# Patient Record
Sex: MALE | Race: WHITE | URBAN - METROPOLITAN AREA
[De-identification: names, ages, dates, MRNs, and addresses within clinical notes are randomized per-mention and may not be internally consistent; named-entity substitution may affect disease eponyms.]

---

## 2017-11-03 ENCOUNTER — EMERGENCY (EMERGENCY)
Facility: HOSPITAL | Age: 27
LOS: 0 days | Discharge: HOME | End: 2017-11-03
Attending: EMERGENCY MEDICINE | Admitting: EMERGENCY MEDICINE
Payer: COMMERCIAL

## 2017-11-03 VITALS — WEIGHT: 175.05 LBS | HEIGHT: 69 IN

## 2017-11-03 VITALS
DIASTOLIC BLOOD PRESSURE: 82 MMHG | SYSTOLIC BLOOD PRESSURE: 135 MMHG | OXYGEN SATURATION: 98 % | TEMPERATURE: 99 F | RESPIRATION RATE: 16 BRPM | HEART RATE: 87 BPM

## 2017-11-03 LAB
APPEARANCE UR: CLEAR — SIGNIFICANT CHANGE UP
BACTERIA # UR AUTO: (no result)
BILIRUB UR-MCNC: NEGATIVE — SIGNIFICANT CHANGE UP
COLOR SPEC: YELLOW — SIGNIFICANT CHANGE UP
COMMENT - URINE: SIGNIFICANT CHANGE UP
DIFF PNL FLD: (no result)
EPI CELLS # UR: SIGNIFICANT CHANGE UP
GLUCOSE UR QL: NEGATIVE MG/DL — SIGNIFICANT CHANGE UP
KETONES UR-MCNC: NEGATIVE — SIGNIFICANT CHANGE UP
LEUKOCYTE ESTERASE UR-ACNC: (no result)
NITRITE UR-MCNC: NEGATIVE — SIGNIFICANT CHANGE UP
PH UR: 7 — SIGNIFICANT CHANGE UP (ref 5–8)
PROT UR-MCNC: 15 MG/DL
RBC CASTS # UR COMP ASSIST: (no result) /HPF (ref 0–4)
SP GR SPEC: 1.01 — SIGNIFICANT CHANGE UP (ref 1.01–1.02)
UROBILINOGEN FLD QL: 1 MG/DL
WBC UR QL: SIGNIFICANT CHANGE UP

## 2017-11-03 PROCEDURE — 99284 EMERGENCY DEPT VISIT MOD MDM: CPT

## 2017-11-03 RX ORDER — PERMETHRIN CREAM 5% W/W 50 MG/G
1 CREAM TOPICAL
Qty: 1 | Refills: 0
Start: 2017-11-03

## 2017-11-03 NOTE — ED STATDOCS - SKIN, MLM
+multiple punctate papular lesions to both arms with excoriations, two lesions with linear distribution, concern for scabies

## 2017-11-03 NOTE — ED STATDOCS - PROGRESS NOTE DETAILS
Patient seen and evaluated, rash to be treated as scabies. ua unremarkable, he is urinating without difficulty and had a post void residual of 30cc on bladder scan. rec uro f/u if he still has discomfort -Cali Levine PA-C

## 2017-11-03 NOTE — ED STATDOCS - NS_ ATTENDINGSCRIBEDETAILS _ED_A_ED_FT
Steve Chavez DO (Attending): The history, relevant review of systems, past medical and surgical history, medical decision making, and physical examination was documented by the scribe in my presence and I attest to the accuracy of the documentation.

## 2017-11-03 NOTE — ED STATDOCS - MEDICAL DECISION MAKING DETAILS
27M no pmhx presents to ED C/O testicular pain yesterday and increased urgency to urinate and suprapubic pain. Plan UA and bladder scan.

## 2017-11-03 NOTE — ED ADULT TRIAGE NOTE - CHIEF COMPLAINT QUOTE
Pt. to the ED C/O "Bladder Pain". Pt. states he is able to void and denies burning, but feels urinary retention after voiding- Last voided this morning- Pt. denies hematuria and or discharge- Pt. also denies medical hx and radiation of pain-

## 2017-11-03 NOTE — ED STATDOCS - ATTENDING CONTRIBUTION TO CARE
I, Steve Chavez DO,  performed the initial face to face bedside interview with this patient regarding history of present illness, review of symptoms and relevant past medical, social and family history.  I completed an independent physical examination.  I was the initial provider who evaluated this patient. I have signed out the follow up of any pending tests (i.e. labs, radiological studies) to the ACP.  I have communicated the patient’s plan of care and disposition with the ACP.

## 2017-11-03 NOTE — ED STATDOCS - OBJECTIVE STATEMENT
27M no pmhx presents to ED C/O testicular pain yesterday (now resolved) and increased urgency to urinate and suprapubic pain. States having the urge to urinate but not being able to. Took 1 ibuprofen with mild relief. No dysuria, no discharge. Denies similar episodes in the past. Sexually active. Also notes a itchy rash on both sides of arm, starting 4 days ago. Denies hematuria. +smoker. No recent travel. 27M no pmhx presents to ED C/O increased urgency to urinate and suprapubic pain. States having the urge to urinate but not being able to. No dysuria, no discharge. Denies similar episodes in the past. Sexually active. Also notes a itchy rash on both sides of arm, starting 4 days ago. Denies hematuria. +smoker. No recent travel.  No F/C/CP/SOB.  No N/V/D.

## 2017-11-04 LAB
C TRACH RRNA SPEC QL NAA+PROBE: SIGNIFICANT CHANGE UP
N GONORRHOEA RRNA SPEC QL NAA+PROBE: SIGNIFICANT CHANGE UP
SPECIMEN SOURCE: SIGNIFICANT CHANGE UP
SPECIMEN SOURCE: SIGNIFICANT CHANGE UP

## 2017-11-09 DIAGNOSIS — R21 RASH AND OTHER NONSPECIFIC SKIN ERUPTION: ICD-10-CM

## 2017-11-09 DIAGNOSIS — R39.15 URGENCY OF URINATION: ICD-10-CM

## 2017-11-09 DIAGNOSIS — R35.0 FREQUENCY OF MICTURITION: ICD-10-CM

## 2017-11-09 DIAGNOSIS — B86 SCABIES: ICD-10-CM

## 2020-10-23 PROBLEM — Z00.00 ENCOUNTER FOR PREVENTIVE HEALTH EXAMINATION: Status: ACTIVE | Noted: 2020-10-23

## 2020-10-26 ENCOUNTER — APPOINTMENT (OUTPATIENT)
Dept: DERMATOLOGY | Facility: CLINIC | Age: 30
End: 2020-10-26
Payer: MEDICAID

## 2020-10-26 VITALS — BODY MASS INDEX: 28.14 KG/M2 | WEIGHT: 190 LBS | HEIGHT: 69 IN

## 2020-10-26 DIAGNOSIS — R21 RASH AND OTHER NONSPECIFIC SKIN ERUPTION: ICD-10-CM

## 2020-10-26 DIAGNOSIS — D48.9 NEOPLASM OF UNCERTAIN BEHAVIOR, UNSPECIFIED: ICD-10-CM

## 2020-10-26 DIAGNOSIS — D23.9 OTHER BENIGN NEOPLASM OF SKIN, UNSPECIFIED: ICD-10-CM

## 2020-10-26 PROCEDURE — 99072 ADDL SUPL MATRL&STAF TM PHE: CPT

## 2020-10-26 PROCEDURE — 99203 OFFICE O/P NEW LOW 30 MIN: CPT | Mod: 25

## 2020-10-26 PROCEDURE — 11102 TANGNTL BX SKIN SINGLE LES: CPT

## 2020-10-26 RX ORDER — MOMETASONE FUROATE 1 MG/G
0.1 OINTMENT TOPICAL
Qty: 1 | Refills: 1 | Status: ACTIVE | COMMUNITY
Start: 2020-10-26 | End: 1900-01-01

## 2020-10-26 RX ORDER — DOXYCYCLINE HYCLATE 100 MG/1
100 TABLET ORAL TWICE DAILY
Qty: 20 | Refills: 0 | Status: ACTIVE | COMMUNITY
Start: 2020-10-26 | End: 1900-01-01

## 2020-11-02 LAB — CORE LAB BIOPSY: NORMAL

## 2023-08-12 ENCOUNTER — EMERGENCY (EMERGENCY)
Facility: HOSPITAL | Age: 33
LOS: 0 days | Discharge: HOME | End: 2023-08-12
Attending: EMERGENCY MEDICINE
Payer: MEDICAID

## 2023-08-12 VITALS — WEIGHT: 195.11 LBS | HEIGHT: 69 IN

## 2023-08-12 VITALS
DIASTOLIC BLOOD PRESSURE: 90 MMHG | SYSTOLIC BLOOD PRESSURE: 150 MMHG | RESPIRATION RATE: 18 BRPM | OXYGEN SATURATION: 99 % | HEART RATE: 99 BPM | TEMPERATURE: 98 F

## 2023-08-12 DIAGNOSIS — M54.50 LOW BACK PAIN, UNSPECIFIED: ICD-10-CM

## 2023-08-12 DIAGNOSIS — G89.29 OTHER CHRONIC PAIN: ICD-10-CM

## 2023-08-12 PROCEDURE — 99283 EMERGENCY DEPT VISIT LOW MDM: CPT

## 2023-08-12 PROCEDURE — 99284 EMERGENCY DEPT VISIT MOD MDM: CPT

## 2023-08-12 RX ORDER — IBUPROFEN 200 MG
600 TABLET ORAL ONCE
Refills: 0 | Status: COMPLETED | OUTPATIENT
Start: 2023-08-12 | End: 2023-08-12

## 2023-08-12 RX ORDER — METHOCARBAMOL 500 MG/1
2 TABLET, FILM COATED ORAL
Qty: 12 | Refills: 0
Start: 2023-08-12 | End: 2023-08-13

## 2023-08-12 RX ORDER — LIDOCAINE 4 G/100G
1 CREAM TOPICAL ONCE
Refills: 0 | Status: COMPLETED | OUTPATIENT
Start: 2023-08-12 | End: 2023-08-12

## 2023-08-12 RX ADMIN — Medication 600 MILLIGRAM(S): at 12:54

## 2023-08-12 RX ADMIN — LIDOCAINE 1 PATCH: 4 CREAM TOPICAL at 12:55

## 2023-08-12 NOTE — ED ADULT TRIAGE NOTE - CHIEF COMPLAINT QUOTE
pt c/o lower back pain "hip to hip" x several months, denies any recent injuries/falls.  pt did not take any pain medications.  No changes in BM or urinary symptoms.

## 2023-08-12 NOTE — ED PROVIDER NOTE - ATTENDING CONTRIBUTION TO CARE
low back pain, positional x several months  no red flags  ambulates with normal gait    likely msk back pain

## 2023-08-12 NOTE — ED PROVIDER NOTE - CLINICAL SUMMARY MEDICAL DECISION MAKING FREE TEXT BOX
33y Male with chronic lumbar pain exacerbated by standing for extended periods of time with no traumatic injuries. No saddle anesthesia, urinary retention, urinary incontinence. Ambulating in ED well, neurovascularly intact, no lumbar tenderness. Overall presentation is consistent with lumbar pain without radiculopathy.

## 2023-08-12 NOTE — ED PROVIDER NOTE - OBJECTIVE STATEMENT
33y Male with no medical history presenting with low back pain x 3 months that is exacerbated by standing at work while a . Denies traumatic injuries, able to ambulate without difficulty. Denies radiating pain down lower extremities. Denies saddle anesthesia, urinary retention, urinary incontinence. Denies fevers, chills, headache, chest pain, palpitations, shortness of breath, cough, nausea, vomiting, diarrhea, hematuria, dysuria, dark stools, focal neurologic symptoms. Denies taking OTC medications or being evaluated by pcp for symptoms.

## 2023-08-12 NOTE — ED PROVIDER NOTE - NSFOLLOWUPINSTRUCTIONS_ED_ALL_ED_FT
Please follow-up with your primary care doctor.  Please call for an appointment in the next 48 hours but if you cannot follow-up with your primary care doctor please return to the Emergency Department for any urgent issues.    If you have any worsening of symptoms or any other concerns please return to the Emergency Department immediately.    Please continue taking your home medications as directed.      Back Pain    Back pain is very common in adults. The cause of back pain is rarely dangerous and the pain often gets better over time. The cause of your back pain may not be known and may include strain of muscles or ligaments, degeneration of the spinal disks, or arthritis. Occasionally the pain may radiate down your leg(s). Over-the-counter medicines to reduce pain and inflammation are often the most helpful. Stretching and remaining active frequently helps the healing process.     SEEK IMMEDIATE MEDICAL CARE IF YOU HAVE ANY OF THE FOLLOWING SYMPTOMS: bowel or bladder control problems, unusual weakness or numbness in your arms or legs, nausea or vomiting, abdominal pain, fever, dizziness/lightheadedness.

## 2023-08-12 NOTE — ED PROVIDER NOTE - PATIENT PORTAL LINK FT
You can access the FollowMyHealth Patient Portal offered by [facility] by registering at the following website: [email or web address]. By joining [employer]’s FollowMyHealth portal, you will also be able to view your health information using other applications (apps) compatible with our system.

## 2023-08-12 NOTE — ED PROVIDER NOTE - PHYSICAL EXAMINATION
General: Well appearing in no acute distress, alert and cooperative  Head: Normocephalic, atraumatic  Eyes: PERRLA, no conjunctival injection, no scleral icterus, EOMI  ENMT: Atraumatic external nose and ears  Neck: Soft and supple, full ROM without pain, no midline tenderness  Cardiac: Regular rate and regular rhythm, no murmurs, peripheral pulses 2+ and symmetric in all extremities  Resp: Unlabored respiratory effort, speaking in full sentences  Abd: Soft, non-tender, non-distended  MSK: Spine midline and non-tender, no lumbar tenderness.   Skin: Warm and dry, no rashes/abrasions/lacerations  Neuro: AO x 3, moves all extremities symmetrically, Motor strength and sensation grossly intact, normal gait.

## 2023-11-20 ENCOUNTER — EMERGENCY (EMERGENCY)
Facility: HOSPITAL | Age: 33
LOS: 0 days | Discharge: HOME | End: 2023-11-20
Attending: STUDENT IN AN ORGANIZED HEALTH CARE EDUCATION/TRAINING PROGRAM
Payer: MEDICAID

## 2023-11-20 VITALS
OXYGEN SATURATION: 100 % | SYSTOLIC BLOOD PRESSURE: 135 MMHG | RESPIRATION RATE: 18 BRPM | TEMPERATURE: 99 F | HEIGHT: 69 IN | DIASTOLIC BLOOD PRESSURE: 83 MMHG | HEART RATE: 69 BPM | WEIGHT: 199.96 LBS

## 2023-11-20 DIAGNOSIS — T43.221A POISONING BY SELECTIVE SEROTONIN REUPTAKE INHIBITORS, ACCIDENTAL (UNINTENTIONAL), INITIAL ENCOUNTER: ICD-10-CM

## 2023-11-20 DIAGNOSIS — F32.A DEPRESSION, UNSPECIFIED: ICD-10-CM

## 2023-11-20 DIAGNOSIS — F41.9 ANXIETY DISORDER, UNSPECIFIED: ICD-10-CM

## 2023-11-20 PROCEDURE — 99285 EMERGENCY DEPT VISIT HI MDM: CPT

## 2023-11-20 PROCEDURE — 93005 ELECTROCARDIOGRAM TRACING: CPT

## 2023-11-20 PROCEDURE — 93010 ELECTROCARDIOGRAM REPORT: CPT

## 2023-11-20 NOTE — ED PROVIDER NOTE - CLINICAL SUMMARY MEDICAL DECISION MAKING FREE TEXT BOX
34 y/o male presents after taking additional dose of sertraline. Exam non-focal pt without any complaints. Will order basic labs. If labs negative will discharge. 32 y/o male presents after taking one additional dose of sertraline. Exam non-focal pt without any complaints, normal vitals and EKG. no suspicion for serotonin syndrome. Will reassure and DC.

## 2023-11-20 NOTE — ED PROVIDER NOTE - PROGRESS NOTE DETAILS
Mary Richardson MD, Attending  Pt now at baseline, normal vitals with normal EKG, requesting to go home.

## 2023-11-20 NOTE — ED PROVIDER NOTE - PATIENT PORTAL LINK FT
You can access the FollowMyHealth Patient Portal offered by [facility] by registering at the following website: [email or web address]. By joining [employer]’s FollowMyHealth portal, you will also be able to view your health information using other applications (apps) compatible with our system.

## 2023-11-20 NOTE — ED PROVIDER NOTE - OBJECTIVE STATEMENT
34 y/o male with PMHx of depression and anxiety on Sertraline BIBEMS to the ED c/o medication mis dose. Pt reports he took two 50mg Sertraline pills 11AM today when he was supposed to take only one, because he forgot he had taken initial dose. Pt states he felt shaky/jittery prior to ED arrival however now feels fine denies any complaints. 34 y/o male with PMHx of depression and anxiety on Sertraline BIBEMS to the ED c/o medication mis dose. Pt reports he took one extra 50mg Sertraline pills 11AM today because he forgot he had already taken his dose. Pt googled symptoms of Sertraline overdose and was concerned he might have a seizure. Pt also reported felt jittery and shaky "like he might have a seizure" prior to ED arrival however now feels fine denies any complaints. Pt denies self-harm and taking more than 2 sertraline tabs.

## 2023-11-20 NOTE — ED ADULT NURSE NOTE - OBJECTIVE STATEMENT
Pt presents to ED A&Ox4 after taking an extra dose of Sertraline. Pt states he took his prescribed dose at 830am and another dose at 11am. Pt states he felt "weird around 1pm" and "didn't want to be around anyone." Pt states he thought he was going to have a seizure so he came to ED. Pt denies cp, SOB, n/v. Pt does not appear to be in distress.

## 2023-11-20 NOTE — ED PROVIDER NOTE - NSFOLLOWUPINSTRUCTIONS_ED_ALL_ED_FT
** Take your medications as prescribed.     ** Go to the nearest Emergency Department if you experience any new or concerning symptoms, such as:   - worsening pain   - chest pain  - difficulty breathing  - passing out  - unable to eat or drink  - unable to move or feel part of your body  - fever, chills

## 2023-11-20 NOTE — ED PROVIDER NOTE - PHYSICAL EXAMINATION
Gen: Well appearing in NAD   Head: NC/AT  Neck: trachea midline  Resp:  No distress  Ext: no deformities  Neuro:  A&O appears non focal  Skin:  Warm and dry as visualized  Psych:  Normal affect and mood Gen: Well appearing in NAD   Head: NC/AT  Neck: trachea midline  Resp:  No distress  Ext: no deformities  Neuro:  A&O x3 appears non focal, normal gait.   Skin:  Warm and dry as visualized  Psych:  Normal affect + anxious mood

## 2023-11-20 NOTE — ED PROVIDER NOTE - NS ED SCRIBE STATEMENT
Attending no weakness/no syncope/no vertigo/no loss of sensation/no difficulty walking/no headache/no confusion

## 2023-11-20 NOTE — ED ADULT TRIAGE NOTE - CHIEF COMPLAINT QUOTE
pt bibems from home s/p taking 2 pills of sertaline 50mg at 11am instead of 1. Pt states " I saw online that I am suppose to seek medical attention". Pt A&ox4, no s/s of distress.

## 2024-07-22 ENCOUNTER — EMERGENCY (EMERGENCY)
Facility: HOSPITAL | Age: 34
LOS: 0 days | Discharge: HOME | End: 2024-07-22
Attending: EMERGENCY MEDICINE
Payer: COMMERCIAL

## 2024-07-22 VITALS
SYSTOLIC BLOOD PRESSURE: 139 MMHG | WEIGHT: 208.78 LBS | TEMPERATURE: 99 F | RESPIRATION RATE: 18 BRPM | HEART RATE: 82 BPM | DIASTOLIC BLOOD PRESSURE: 85 MMHG | OXYGEN SATURATION: 99 %

## 2024-07-22 DIAGNOSIS — T43.221A POISONING BY SELECTIVE SEROTONIN REUPTAKE INHIBITORS, ACCIDENTAL (UNINTENTIONAL), INITIAL ENCOUNTER: ICD-10-CM

## 2024-07-22 DIAGNOSIS — R45.0 NERVOUSNESS: ICD-10-CM

## 2024-07-22 PROCEDURE — 99284 EMERGENCY DEPT VISIT MOD MDM: CPT

## 2024-07-22 PROCEDURE — 93005 ELECTROCARDIOGRAM TRACING: CPT

## 2024-07-22 PROCEDURE — 99285 EMERGENCY DEPT VISIT HI MDM: CPT

## 2024-07-22 PROCEDURE — 93010 ELECTROCARDIOGRAM REPORT: CPT

## 2024-07-22 NOTE — ED STATDOCS - ATTENDING APP SHARED VISIT CONTRIBUTION OF CARE
I personally saw the patient with the JILL, and completed the key components of the history and physical exam. I then discussed the management plan with the JILL.

## 2024-07-22 NOTE — ED STATDOCS - PROGRESS NOTE DETAILS
34 y/o male presents to the ED for evaluation. Pt states he accidentally took an extra dose of his Zoloft 50mg this morning because he did not remember if he took it. Pt states "I feel hot, shaky, nervous and panicky." No other complaints at this time.  Eve Flores PA-C No intervention needed.  DC with follow up care.  Eve Flores PA-C

## 2024-07-22 NOTE — ED STATDOCS - OBJECTIVE STATEMENT
32 y/o male presents to the ED for evaluation. Pt states he accidentally took an extra dose of his Zoloft 50mg this morning because he did not remember if he took it. Pt states "I feel hot, shaky, nervous and panicky." No other complaints at this time.

## 2024-07-22 NOTE — ED STATDOCS - PATIENT PORTAL LINK FT
You can access the FollowMyHealth Patient Portal offered by [facility] by registering at the following website: [email or web address]. By joining [facility]’s FollowMyHealth portal, you will also be able to view your health information using other applications (apps) compatible with our system.

## 2024-07-22 NOTE — ED ADULT TRIAGE NOTE - CHIEF COMPLAINT QUOTE
Pt comes to the ED as he believes that he accidently doubled his Zoloft this am. Pt usually takes 50mg but thinks that he took 100mg at 8:30 this am. Pt states that he is feeling "panicky and shaky"

## 2024-07-22 NOTE — ED STATDOCS - NSFOLLOWUPINSTRUCTIONS_ED_ALL_ED_FT
Adult Overdose    WHAT YOU NEED TO KNOW:    An overdose occurs when you take more medicine than is safe to take. An overdose may be mild, or it may be a life-threatening emergency. You may feel drowsy, dizzy, or nauseated, depending on what medicine you took. No specific harm was found to your body as a result of your overdose. Your symptoms have decreased over the last 6 to 12 hours.    DISCHARGE INSTRUCTIONS:    Call 911 if you or someone close to you has any of the following symptoms:    Your face is very pale and clammy to the touch.    Your body is limp or you are unable to speak.    You cannot be awakened.    Your breathing is slower or faster than usual.    Your heart is beating slower than usual.    You feel confused or more tired than usual, or you are sweating more than normal.    Your speech is slurred.    Your fingernails or lips are blue or purple.  Return to the emergency department if:    You have severe nausea and vomiting.    You cannot have a bowel movement or urinate.    Your skin and the whites of your eyes turn yellow.  Contact your healthcare provider if:    You think your medicine is not working.    You have nausea, vomiting, diarrhea, or abdominal cramps.    You have questions or concerns about your medicine.  Take your medicine as directed: Contact your healthcare provider if you think your medicine is not helping or if you have side effects. Do not take more medicine that is prescribed. Keep your medicines in the original containers. Keep a list of the medicines, vitamins, and herbs you take. Include the amounts, and when and why you take them. Do not share your medicine with others.    Prevent another overdose:    Read labels carefully. Read the labels of all the medicines that you take. Never take more than the label says to take. If you have questions, ask your pharmacist or healthcare provider.    Do not drink alcohol. Alcohol increases your risk for another overdose. Alcohol can also hide important symptoms that you need to call your healthcare provider for.    Do not drive or operate machinery until your healthcare provider says it is okay. These activities may be dangerous after an overdose.    Use caution if you take more than one medicine at a time. Mixing medicines or taking more than one medicine at a time can be dangerous.    Tell your family or friends what medicines you are taking. Talk with them about what to do if you have an overdose.  Follow up with your healthcare provider as directed: You may need to see a counselor or psychiatrist. Write down your questions so you remember to ask them during your visits.

## 2024-12-03 ENCOUNTER — APPOINTMENT (OUTPATIENT)
Dept: INTERNAL MEDICINE | Facility: CLINIC | Age: 34
End: 2024-12-03

## 2025-06-07 ENCOUNTER — NON-APPOINTMENT (OUTPATIENT)
Age: 35
End: 2025-06-07

## 2025-08-24 ENCOUNTER — NON-APPOINTMENT (OUTPATIENT)
Age: 35
End: 2025-08-24